# Patient Record
Sex: MALE | Race: WHITE | URBAN - METROPOLITAN AREA
[De-identification: names, ages, dates, MRNs, and addresses within clinical notes are randomized per-mention and may not be internally consistent; named-entity substitution may affect disease eponyms.]

---

## 2019-02-19 ENCOUNTER — OUTPATIENT (OUTPATIENT)
Dept: OUTPATIENT SERVICES | Facility: HOSPITAL | Age: 44
LOS: 1 days | End: 2019-02-19

## 2019-03-04 ENCOUNTER — OUTPATIENT (OUTPATIENT)
Dept: OUTPATIENT SERVICES | Facility: HOSPITAL | Age: 44
LOS: 1 days | End: 2019-03-04

## 2021-11-22 ENCOUNTER — TRANSCRIPTION ENCOUNTER (OUTPATIENT)
Age: 46
End: 2021-11-22

## 2021-12-24 ENCOUNTER — TRANSCRIPTION ENCOUNTER (OUTPATIENT)
Age: 46
End: 2021-12-24

## 2023-06-29 ENCOUNTER — APPOINTMENT (OUTPATIENT)
Dept: RADIOLOGY | Facility: CLINIC | Age: 48
End: 2023-06-29
Payer: COMMERCIAL

## 2023-06-29 PROCEDURE — 73030 X-RAY EXAM OF SHOULDER: CPT | Mod: RT

## 2023-06-29 PROCEDURE — 73565 X-RAY EXAM OF KNEES: CPT

## 2024-02-06 ENCOUNTER — NON-APPOINTMENT (OUTPATIENT)
Age: 49
End: 2024-02-06

## 2024-02-06 ENCOUNTER — APPOINTMENT (OUTPATIENT)
Dept: OPHTHALMOLOGY | Facility: CLINIC | Age: 49
End: 2024-02-06
Payer: COMMERCIAL

## 2024-02-06 PROCEDURE — 92015 DETERMINE REFRACTIVE STATE: CPT

## 2024-02-06 PROCEDURE — 92004 COMPRE OPH EXAM NEW PT 1/>: CPT

## 2024-05-17 ENCOUNTER — NON-APPOINTMENT (OUTPATIENT)
Age: 49
End: 2024-05-17

## 2024-05-18 PROBLEM — Z00.00 ENCOUNTER FOR PREVENTIVE HEALTH EXAMINATION: Status: ACTIVE | Noted: 2024-05-18

## 2024-06-05 ENCOUNTER — APPOINTMENT (OUTPATIENT)
Dept: ORTHOPEDIC SURGERY | Facility: CLINIC | Age: 49
End: 2024-06-05
Payer: COMMERCIAL

## 2024-06-05 DIAGNOSIS — Z78.9 OTHER SPECIFIED HEALTH STATUS: ICD-10-CM

## 2024-06-05 DIAGNOSIS — M47.816 SPONDYLOSIS W/OUT MYELOPATHY OR RADICULOPATHY, LUMBAR REGION: ICD-10-CM

## 2024-06-05 PROCEDURE — 99203 OFFICE O/P NEW LOW 30 MIN: CPT

## 2024-06-05 NOTE — HISTORY OF PRESENT ILLNESS
[de-identified] : Patient is a 48-year-old male presents with a long history of low back pain.  Patient states about 4 weeks ago he had a fall and the pain got worse.  He was having pain prior to that.  He went to urgent care at that time.  Was given muscle relaxants as well as an anti-inflammatory.  He says the pain has improved.  He says is about a 2 out of 10 at this time.  He denies any numbness or tingling down the legs or any bowel bladder symptoms.  He does state that he gets occasional pain in the right leg when crossing his legs at night [FreeTextEntry1] : Back pan

## 2024-06-05 NOTE — IMAGING
[de-identified] : He is alert and oriented vital signs are stable.  He is able to stand on his toes and stand on his heels  Examination lumbar sacral spine reveals some tenderness palpation the midline region with no evidence for spasm.  He has slight restricted range of motion in flexion extension lateral rotation lateral bending.  He has 5-5 strength bilateral lower extremities 1+ deep tendon reflexes throughout negative straight leg raising.  He has a normal neurologic exam. Vascular exam. Normal skin exam. No evidence of open wounds infection or compartment syndrome.

## 2024-06-05 NOTE — DATA REVIEWED
[FreeTextEntry1] : X-rays lumbar spine May 20, 2024 revealed no fractures or dislocations.  There was evidence for some loss of normal lumbar lordosis with some mild disc space narrowing noted L4-5 and L5-S1

## 2024-06-05 NOTE — ASSESSMENT
[FreeTextEntry1] : Lumbosacral degenerative disc disease  Plan anti-inflammatories with GI precautions as needed the heating pad and recommend a course of conservative treatment physical therapy.  He has a long history of low back pain that got worse recently.  He says the pain is much improved at this time.  Will try course of conservative treatment of physical therapy.  He will follow-up if symptoms do not improve or gets worse or gets any radicular symptoms.  Did advise if symptoms not improve he may ultimately require an MRI.  All questions were answered he is agreeable with the plan.  Symptoms of Cauda equina were discussed with the patient. Patient was advised if they develop any numbness or tingling down the legs, weakness, or bowel or bladder symptoms they must call the office or go to the ER immediately.  This medical record was created utilizing Dragon voice recognition software.  This software is not perfect and may occasionally create typographical errors which may be reflected in the above medical record.